# Patient Record
Sex: FEMALE | Race: BLACK OR AFRICAN AMERICAN | NOT HISPANIC OR LATINO | Employment: OTHER | URBAN - METROPOLITAN AREA
[De-identification: names, ages, dates, MRNs, and addresses within clinical notes are randomized per-mention and may not be internally consistent; named-entity substitution may affect disease eponyms.]

---

## 2023-09-25 PROBLEM — H10.9 CONJUNCTIVITIS: Status: ACTIVE | Noted: 2023-09-25

## 2023-09-25 PROBLEM — N92.6 IRREGULAR MENSTRUAL CYCLE: Status: ACTIVE | Noted: 2023-09-25

## 2023-09-25 PROBLEM — Z96.22 HISTORY OF PLACEMENT OF EAR TUBES: Status: ACTIVE | Noted: 2023-09-25

## 2023-09-25 PROBLEM — F90.9 ATTENTION-DEFICIT/HYPERACTIVITY DISORDER: Status: ACTIVE | Noted: 2023-09-25

## 2023-09-25 PROBLEM — F91.9 DISRUPTIVE BEHAVIOR DISORDER: Status: ACTIVE | Noted: 2023-09-25

## 2023-09-25 PROBLEM — H69.93 CHRONIC DYSFUNCTION OF BOTH EUSTACHIAN TUBES: Status: ACTIVE | Noted: 2023-09-25

## 2023-09-25 PROBLEM — G80.9 CEREBRAL PALSY (MULTI): Status: ACTIVE | Noted: 2023-09-25

## 2023-09-25 PROBLEM — G47.00 INSOMNIA: Status: ACTIVE | Noted: 2023-09-25

## 2023-09-25 PROBLEM — F98.0 SECONDARY NOCTURNAL ENURESIS: Status: ACTIVE | Noted: 2023-09-25

## 2023-09-25 PROBLEM — G40.309: Status: ACTIVE | Noted: 2023-09-25

## 2023-09-25 PROBLEM — H92.11 OTORRHEA OF RIGHT EAR: Status: ACTIVE | Noted: 2023-09-25

## 2023-09-25 PROBLEM — D50.9 IRON DEFICIENCY ANEMIA: Status: ACTIVE | Noted: 2023-09-25

## 2023-09-25 RX ORDER — OXYBUTYNIN CHLORIDE 5 MG/1
1 TABLET ORAL DAILY
COMMUNITY
Start: 2014-04-08

## 2023-09-25 RX ORDER — LISDEXAMFETAMINE DIMESYLATE 50 MG/1
1 CAPSULE ORAL
COMMUNITY
Start: 2023-05-30

## 2023-09-25 RX ORDER — DIVALPROEX SODIUM 125 MG/1
CAPSULE, COATED PELLETS ORAL
COMMUNITY
Start: 2015-06-01 | End: 2024-04-26 | Stop reason: SDUPTHER

## 2023-09-25 RX ORDER — HYDROXYZINE PAMOATE 50 MG/1
50 CAPSULE ORAL 2 TIMES DAILY
COMMUNITY
Start: 2015-04-20

## 2023-09-25 RX ORDER — FLUOXETINE HYDROCHLORIDE 40 MG/1
1 CAPSULE ORAL
COMMUNITY
Start: 2016-12-21

## 2023-09-25 RX ORDER — TRAZODONE HYDROCHLORIDE 100 MG/1
1 TABLET ORAL NIGHTLY
COMMUNITY

## 2023-09-25 RX ORDER — ASPIRIN 325 MG
TABLET ORAL
COMMUNITY
Start: 2020-05-26

## 2023-09-25 RX ORDER — OFLOXACIN 3 MG/ML
SOLUTION AURICULAR (OTIC) 2 TIMES DAILY
COMMUNITY
Start: 2022-01-12

## 2023-09-25 RX ORDER — OLANZAPINE 20 MG/1
1 TABLET ORAL NIGHTLY
COMMUNITY
Start: 2023-05-25

## 2023-09-25 RX ORDER — ATOMOXETINE 25 MG/1
CAPSULE ORAL
COMMUNITY

## 2023-09-25 RX ORDER — TRAZODONE HYDROCHLORIDE 100 MG/1
200 TABLET ORAL
COMMUNITY
Start: 2023-05-25

## 2023-09-25 RX ORDER — BACLOFEN 10 MG/1
TABLET ORAL
COMMUNITY
Start: 2013-11-11

## 2023-09-25 RX ORDER — LORAZEPAM 2 MG/1
2 TABLET ORAL DAILY PRN
COMMUNITY
Start: 2023-05-25

## 2023-09-25 RX ORDER — IRON POLYSACCHARIDE COMPLEX 150 MG
1 CAPSULE ORAL DAILY
COMMUNITY
Start: 2018-12-26

## 2023-10-04 ENCOUNTER — TELEMEDICINE (OUTPATIENT)
Dept: NEUROLOGY | Facility: CLINIC | Age: 28
End: 2023-10-04
Payer: MEDICAID

## 2023-10-04 DIAGNOSIS — G40.309: Primary | ICD-10-CM

## 2023-10-04 PROCEDURE — 99213 OFFICE O/P EST LOW 20 MIN: CPT | Performed by: PSYCHIATRY & NEUROLOGY

## 2023-10-06 NOTE — PROGRESS NOTES
Laura Albaradoedie Montes De Oca is a 27 y.o. year old female here for virtual follow-up.    Virtual or Telephone Consent    An interactive audio and video telecommunication system which permits real time communications between the patient (at the originating site) and provider (at the distant site) was utilized to provide this telehealth service.   Verbal consent was requested and obtained from Mariann Vanegas (mother) on this date, 10/04/23 for a telehealth visit.     No seizures in the last year.  Her mother doesn't identify new focal neurological symptoms including dysarthria, dysphagia, diplopia, focal weakness, focal sensory change, ataxia, vertigo, or bowel/bladder incontinence, among others.          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)    Conjunctivitis    Chronic dysfunction of both eustachian tubes    Cerebral palsy (CMS/HCC)    Disruptive behavior disorder    Attention-deficit/hyperactivity disorder     Past Medical History:   Diagnosis Date    Acute vaginitis     Vaginal infection    Anodontia     Hypodontia    Blepharophimosis unspecified eye, unspecified lid     Blepharophimosis    Cleft lip, unilateral     Cleft lip    Conductive hearing loss, unspecified     Conductive hearing loss    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, unspecified     Ocular hypotelorism    Contracture, unspecified hand     Contracture, joint, hand    Delayed milestone in childhood 01/12/2022    Delayed developmental milestones    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, unspecified     Chromosomal deletion syndrome    Disorder of bone density and structure, unspecified     Disorder of bone density and structure    Other abnormal findings in specimens from other organs, systems and tissues 07/20/2016     Abnormal chromosomal analysis    Other conditions influencing health status     Congenital Pelvic Kidney    Other conditions influencing health status 2019    Adult body mass index between 19-24    Other conditions influencing health status     Nail Dystrophy    Other disorders of optic nerve, not elsewhere classified, unspecified eye     Optic nerve disorder    Other symptoms and signs involving appearance and behavior     Aggressive behavior    Personal history of (corrected) cleft lip and palate     History of cleft palate    Personal history of (corrected) congenital malformations of heart and circulatory system     History of congenital anomaly of heart    Personal history of diseases of the skin and subcutaneous tissue     History of hyperpigmentation of skin    Personal history of diseases of the skin and subcutaneous tissue     History of alopecia    Personal history of other (corrected) congenital malformations     History of birth defect    Personal history of other complications of pregnancy, childbirth and the puerperium     History of galactorrhea    Personal history of other diseases of the female genital tract     History of menorrhagia    Personal history of other diseases of the female genital tract     History of premenstrual syndrome    Personal history of other diseases of the female genital tract     History of breast disorder    Personal history of other diseases of the nervous system and sense organs 2018    History of acute otitis media    Personal history of other endocrine, nutritional and metabolic disease     History of hyperprolactinemia    Personal history of other mental and behavioral disorders     History of attention deficit hyperactivity disorder    Personal history of other specified conditions 2018    History of convulsions    Personal history of other specified conditions     History of headache     , unspecified weeks of gestation     Premature  baby    Syndactyly, unspecified     Syndactyly    Unspecified lack of expected normal physiological development in childhood     Development delay    Unspecified otitis externa, unspecified ear 06/30/2013    Otitis externa    Vitiligo 07/20/2016    Vitiligo     Past Surgical History:   Procedure Laterality Date    FOOT SURGERY  06/07/2021    Foot Surgery    OTHER SURGICAL HISTORY  06/07/2021    Oral Surgery    TYMPANOSTOMY TUBE PLACEMENT  04/29/2014    Ear Pressure Equalization Tube     Social History     Tobacco Use    Smoking status: Not on file    Smokeless tobacco: Not on file   Substance Use Topics    Alcohol use: Not on file     family history includes Cancer in her maternal great-grandmother; Diabetes in an other family member; Hypertension in her mother and other family members; Premature baby in her brother; Stroke in her maternal great-grandfather.    Current Outpatient Medications:     atomoxetine (Strattera) 25 mg capsule, Take by mouth., Disp: , Rfl:     baclofen (Lioresal) 10 mg tablet, Take by mouth., Disp: , Rfl:     divalproex sprinkle (Depakote Sprinkle) 125 mg DR capsule, Take by mouth., Disp: , Rfl:     FLUoxetine (PROzac) 40 mg capsule, Take 1 capsule (40 mg) by mouth once daily in the morning. Take before meals., Disp: , Rfl:     hydrOXYzine pamoate (Vistaril) 50 mg capsule, Take 1 capsule (50 mg) by mouth twice a day., Disp: , Rfl:     iron polysaccharides (Nu-Iron,Niferex) 150 mg iron capsule, Take 1 capsule (150 mg) by mouth once daily., Disp: , Rfl:     lisdexamfetamine (Vyvanse) 50 mg capsule, Take 1 capsule (50 mg) by mouth once daily in the morning. Take before meals., Disp: , Rfl:     LORazepam (Ativan) 2 mg tablet, Take 1 tablet (2 mg) by mouth once daily as needed., Disp: , Rfl:     multivit with min-folic acid (Adult Multivitamin Gummies) 200 mcg tablet,chewable, Chew once daily., Disp: , Rfl:     ofloxacin (Floxin) 0.3 % otic solution, Administer into affected ear(s) twice a day.,  Disp: , Rfl:     OLANZapine (ZyPREXA) 20 mg tablet, Take 1 tablet (20 mg) by mouth once daily at bedtime., Disp: , Rfl:     oxybutynin (Ditropan) 5 mg tablet, Take 1 tablet (5 mg) by mouth once daily., Disp: , Rfl:     traZODone (Desyrel) 100 mg tablet, Take 2 tablets (200 mg) by mouth., Disp: , Rfl:     traZODone (Desyrel) 100 mg tablet, Take 1 tablet (100 mg) by mouth once daily at bedtime., Disp: , Rfl:   No Known Allergies    Objective     There were no vitals taken for this visit.    CONSTITUTIONAL:  No acute distress    MENTAL STATUS:  Awake, alert, fully oriented to self, place, and time, with present short-term memory, good awareness of recent events, normal attention span, concentration, and fund of knowledge.    SPEECH AND LANGUAGE:  Can name and repeat, follows all commands, has no dysarthria    CRANIAL NERVES:  II-Vision grossly present as she looks at me through the screen.    III/IV/VI--EOMs are present in all directions.  No ptosis.    V--Normal jaw movement.    VII--No facial asymmetry.    VIII--Hearing grossly present to voice bilaterally.    IX/X--Symmetric soft palate rise.    XI--Normal trapezius power bilaterally.    XII--Tongue protrudes without deviation.    MOTOR:  Antigravity power in both arms and both legs.    SENSORY:  Not completed because of virtual visit    COORDINATION:  No obvious ataxia.    REFLEXES Not completed because of virtual visit.    GAIT Not completed because of virtual visit.      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 Staring spells that could be focal seizures or behavioral episodes. Cerebral palsy and developmental disability.     PLAN: To continue divalproex.  I will see her in one year or prn.     Teodoro Weiner Jr., M.D., FAAN

## 2024-04-24 DIAGNOSIS — G40.309: Primary | ICD-10-CM

## 2024-04-29 RX ORDER — DIVALPROEX SODIUM 125 MG/1
750 CAPSULE, COATED PELLETS ORAL 2 TIMES DAILY
Qty: 1080 CAPSULE | Refills: 1 | Status: SHIPPED | OUTPATIENT
Start: 2024-04-29 | End: 2024-05-06 | Stop reason: SDUPTHER

## 2024-05-06 DIAGNOSIS — G40.309: ICD-10-CM

## 2024-05-07 RX ORDER — DIVALPROEX SODIUM 125 MG/1
750 CAPSULE, COATED PELLETS ORAL 2 TIMES DAILY
Qty: 1080 CAPSULE | Refills: 1 | Status: SHIPPED | OUTPATIENT
Start: 2024-05-07 | End: 2024-11-03

## 2024-10-03 ENCOUNTER — APPOINTMENT (OUTPATIENT)
Dept: NEUROLOGY | Facility: CLINIC | Age: 29
End: 2024-10-03
Payer: MEDICAID

## 2024-10-03 DIAGNOSIS — G40.309: Primary | ICD-10-CM

## 2024-10-03 PROCEDURE — 99213 OFFICE O/P EST LOW 20 MIN: CPT | Performed by: PSYCHIATRY & NEUROLOGY

## 2024-10-03 PROCEDURE — 1036F TOBACCO NON-USER: CPT | Performed by: PSYCHIATRY & NEUROLOGY

## 2024-10-03 RX ORDER — DIVALPROEX SODIUM 125 MG/1
750 CAPSULE, COATED PELLETS ORAL 2 TIMES DAILY
Qty: 1080 CAPSULE | Refills: 3 | Status: SHIPPED | OUTPATIENT
Start: 2024-10-03 | End: 2025-10-03

## 2024-10-03 NOTE — PROGRESS NOTES
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 Cerebral palsy and developmental disability.     PLAN:  To continue divalproex. I will see her in one year or prn.       Teodoro Weiner Jr., M.D., FAAN   ----------    Virtual or Telephone Consent    An interactive audio and video telecommunication system which permits real time communications between the patient (at the originating site) and provider (at the distant site) was utilized to provide this telehealth service.   Verbal consent was requested and obtained for minor from Mariann Vanegas (mother) on this date, 10/03/24, for a telehealth visit.     Subjective     Larisa Montes De Oca is a 28 y.o. year old female here for follow-up.  Her mother is present and provides all history as the patient cannot provide any history.    No seizures in the last year.     Her mother doesn't identify new focal neurological symptoms including dysarthria, dysphagia, diplopia, focal weakness, focal sensory change, ataxia, vertigo, or bowel/bladder incontinence, among others.     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, unspecified ([facility]-HCC)     Chromosomal deletion syndrome    Disorder of bone density and structure, unspecified     Disorder of bone density and structure    Other abnormal findings  in specimens from other organs, systems and tissues 07/20/2016    Abnormal chromosomal analysis    Other conditions influencing health status     Congenital Pelvic Kidney    Other conditions influencing health status 12/04/2019    Adult body mass index between 19-24    Other conditions influencing health status     Nail Dystrophy    Other disorders of optic nerve, not elsewhere classified, unspecified eye     Optic nerve disorder    Other symptoms and signs involving appearance and behavior     Aggressive behavior    Personal history of (corrected) cleft lip and palate     History of cleft palate    Personal history of (corrected) congenital malformations of heart and circulatory system     History of congenital anomaly of heart    Personal history of diseases of the skin and subcutaneous tissue     History of hyperpigmentation of skin    Personal history of diseases of the skin and subcutaneous tissue     History of alopecia    Personal history of other (corrected) congenital malformations     History of birth defect    Personal history of other complications of pregnancy, childbirth and the puerperium     History of galactorrhea    Personal history of other diseases of the female genital tract     History of menorrhagia    Personal history of other diseases of the female genital tract     History of premenstrual syndrome    Personal history of other diseases of the female genital tract     History of breast disorder    Personal history of other diseases of the nervous system and sense organs 12/26/2018    History of acute otitis media    Personal history of other endocrine, nutritional and metabolic disease     History of hyperprolactinemia    Personal history of other mental and behavioral disorders     History of attention deficit hyperactivity disorder    Personal history of other specified conditions 04/04/2018    History of convulsions    Personal history of other specified conditions     History of headache      , unspecified weeks of gestation ([facility])     Premature baby    Syndactyly, unspecified     Syndactyly    Unspecified lack of expected normal physiological development in childhood     Development delay    Unspecified otitis externa, unspecified ear 2013    Otitis externa    Vitiligo 2016    Vitiligo     Past Surgical History:   Procedure Laterality Date    FOOT SURGERY  2021    Foot Surgery    OTHER SURGICAL HISTORY  2021    Oral Surgery    TYMPANOSTOMY TUBE PLACEMENT  2014    Ear Pressure Equalization Tube     Social History     Tobacco Use    Smoking status: Never    Smokeless tobacco: Never   Substance Use Topics    Alcohol use: Never     family history includes Cancer in her maternal great-grandmother; Diabetes in an other family member; Hypertension in her mother and other family members; Premature baby in her brother; Stroke in her maternal great-grandfather.    Current Outpatient Medications:     atomoxetine (Strattera) 25 mg capsule, Take by mouth., Disp: , Rfl:     baclofen (Lioresal) 10 mg tablet, Take by mouth., Disp: , Rfl:     divalproex sprinkle (Depakote Sprinkle) 125 mg DR capsule, Take 6 capsules (750 mg) by mouth 2 times a day., Disp: 1080 capsule, Rfl: 1    FLUoxetine (PROzac) 40 mg capsule, Take 1 capsule (40 mg) by mouth once daily in the morning. Take before meals., Disp: , Rfl:     hydrOXYzine pamoate (Vistaril) 50 mg capsule, Take 1 capsule (50 mg) by mouth twice a day., Disp: , Rfl:     iron polysaccharides (Nu-Iron,Niferex) 150 mg iron capsule, Take 1 capsule (150 mg) by mouth once daily., Disp: , Rfl:     lisdexamfetamine (Vyvanse) 50 mg capsule, Take 1 capsule (50 mg) by mouth once daily in the morning. Take before meals., Disp: , Rfl:     LORazepam (Ativan) 2 mg tablet, Take 1 tablet (2 mg) by mouth once daily as needed., Disp: , Rfl:     multivit with min-folic acid (Adult Multivitamin Gummies) 200 mcg tablet,chewable, Chew once daily.,  Disp: , Rfl:     ofloxacin (Floxin) 0.3 % otic solution, Administer into affected ear(s) twice a day., Disp: , Rfl:     OLANZapine (ZyPREXA) 20 mg tablet, Take 1 tablet (20 mg) by mouth once daily at bedtime., Disp: , Rfl:     oxybutynin (Ditropan) 5 mg tablet, Take 1 tablet (5 mg) by mouth once daily., Disp: , Rfl:     traZODone (Desyrel) 100 mg tablet, Take 2 tablets (200 mg) by mouth., Disp: , Rfl:     traZODone (Desyrel) 100 mg tablet, Take 1 tablet (100 mg) by mouth once daily at bedtime., Disp: , Rfl:   No Known Allergies    Objective     There were no vitals taken for this visit. (Virtual visit)    CONSTITUTIONAL:  No acute distress    MENTAL STATUS:  Awake, alert, restless, cooperates with her mother.    SPEECH AND LANGUAGE:  Can speak simple words and phrases.    CRANIAL NERVES:  II-Vision grossly present, visual fields deferred because of virtual visit    III/IV/VI--EOMs are present in all direction spontaneously.  No ptosis.  Pupillary response deferred because of virtual visit    V--Normal jaw movement.  Sensation deferred because of virtual visit    VII--No facial asymmetry.    VIII--Hearing grossly present given ability to hear me on the call.    IX/X--Unable because of cognitive status    XI--Antigravity, symmetrical trapezius power bilaterally.    XII--Tongue protrudes spontaneously without deviation.    MOTOR:  Symmetric, antigravity power of both arms and both legs    SENSORY:  Deferred because of virtual visit    COORDINATION:  Normal finger-to-nose and heel-to-shin testing in both arms and both legs.    REFLEXES Deferred because of virtual visit    GAIT Normal      Teodoro Weiner Jr., M.D., [facility]